# Patient Record
(demographics unavailable — no encounter records)

---

## 2025-02-18 NOTE — REVIEW OF SYSTEMS
[Sneezing] : sneezing [Seasonal Allergies] : seasonal allergies [Hearing Loss] : hearing loss [Dizziness] : dizziness [Vertigo] : vertigo [Nasal Congestion] : nasal congestion [Problem Snoring] : problem snoring [Snoring With Pauses] : snoring with pauses [Joint Pain] : joint pain [Negative] : Heme/Lymph [Patient Intake Form Reviewed] : Patient intake form was reviewed [[email or web address]] : muscle aches